# Patient Record
Sex: FEMALE | Race: WHITE | NOT HISPANIC OR LATINO
[De-identification: names, ages, dates, MRNs, and addresses within clinical notes are randomized per-mention and may not be internally consistent; named-entity substitution may affect disease eponyms.]

---

## 2020-12-24 ENCOUNTER — NON-APPOINTMENT (OUTPATIENT)
Age: 45
End: 2020-12-24

## 2020-12-28 PROBLEM — Z00.00 ENCOUNTER FOR PREVENTIVE HEALTH EXAMINATION: Status: ACTIVE | Noted: 2020-12-28

## 2021-01-05 ENCOUNTER — APPOINTMENT (OUTPATIENT)
Dept: OTOLARYNGOLOGY | Facility: CLINIC | Age: 46
End: 2021-01-05
Payer: COMMERCIAL

## 2021-01-05 VITALS
WEIGHT: 150 LBS | BODY MASS INDEX: 24.11 KG/M2 | HEART RATE: 63 BPM | OXYGEN SATURATION: 99 % | DIASTOLIC BLOOD PRESSURE: 90 MMHG | HEIGHT: 66 IN | SYSTOLIC BLOOD PRESSURE: 142 MMHG | TEMPERATURE: 98.2 F

## 2021-01-05 DIAGNOSIS — Z86.39 PERSONAL HISTORY OF OTHER ENDOCRINE, NUTRITIONAL AND METABOLIC DISEASE: ICD-10-CM

## 2021-01-05 DIAGNOSIS — Z80.9 FAMILY HISTORY OF MALIGNANT NEOPLASM, UNSPECIFIED: ICD-10-CM

## 2021-01-05 DIAGNOSIS — Z78.9 OTHER SPECIFIED HEALTH STATUS: ICD-10-CM

## 2021-01-05 PROCEDURE — 31575 DIAGNOSTIC LARYNGOSCOPY: CPT

## 2021-01-05 PROCEDURE — 99204 OFFICE O/P NEW MOD 45 MIN: CPT | Mod: 25

## 2021-01-05 PROCEDURE — 99072 ADDL SUPL MATRL&STAF TM PHE: CPT

## 2021-01-05 PROCEDURE — 76536 US EXAM OF HEAD AND NECK: CPT

## 2021-01-05 RX ORDER — SERTRALINE HYDROCHLORIDE 50 MG/1
50 TABLET, FILM COATED ORAL
Refills: 0 | Status: ACTIVE | COMMUNITY

## 2021-01-05 RX ORDER — NORETHINDRONE ACETATE AND ETHINYL ESTRADIOL, ETHINYL ESTRADIOL AND FERROUS FUMARATE 1MG-10(24)
KIT ORAL
Refills: 0 | Status: ACTIVE | COMMUNITY

## 2021-01-05 RX ORDER — SIMVASTATIN 40 MG/1
40 TABLET, FILM COATED ORAL
Refills: 0 | Status: ACTIVE | COMMUNITY

## 2021-01-05 NOTE — PROCEDURE
[Image(s) Captured] : image(s) captured and filed [Unable to Cooperate with Mirror] : patient unable to cooperate with mirror [Gag Reflex] : gag reflex preventing mirror examination [Topical Lidocaine] : topical lidocaine [Oxymetazoline HCl] : oxymetazoline HCl [Flexible Endoscope] : examined with the flexible endoscope [Serial Number: ___] : Serial Number: [unfilled] [FreeTextEntry1] : Physician performed high-resolution ultrasound gray scale imaging and color Doppler supplementation of the thyroid gland and neck was obtained in the longitudinal and transverse planes using a 13 MHz linear transducer with image capture.  All measurements are in centimeters (longitudinal x AP x transverse).  [FreeTextEntry2] : Primary hyperparathyroidism rule out a parathyroid adenoma and thyroid disease for preoperative assessment. [FreeTextEntry3] : See dictated report. Preliminary findings: Overall the thyroid gland is atrophic and heterogeneous consistent with long-standing autoimmune thyroiditis. There are no enlarged or morphologically abnormal appearing cervical lymph nodes. There were no discrete thyroid nodules. Posterior to the left mid thyroid lobe there is an oblong,  hypoechoic, smoothly marginated nodule with an echogenic line of separation that has a central vascular pedicle, measures 1.51 x 0.41 x 0.60 cm and is compatible with a parathyroid adenoma. A 4-D CT scan will be obtained for confirmation.  [de-identified] : The nasal septum is minimally deviated to the left. There are no masses or polyps and the nasal mucosa and secretions are normal. The choanae and posterior nasopharynx are normal without masses or drainage. The Eustachian tube orifices appear patent. The pharynx, including the posterior and lateral pharyngeal walls, the vallecula and base of tongue are normal without ulcerations, lesions or masses. The hypopharynx including the pyriform sinuses open well without pooling of secretions, mucosal lesions or masses. The supraglottic larynx including the epiglottis, petiole, arytenoids, glossoepiglottic, aryepiglottic and pharyngoepiglottic folds are normal without mucosal lesions, ulcerations or masses. The glottis reveals normal false vocal folds. The true vocal folds are glistening white, tense and of equal length, without paralysis, having symmetric mobility on adduction and abduction. There are no mucosal lesions, nodules, cysts, erythroplasia or leukoplakia. The posterior cricoid area has healthy pink mucosa in the interarytenoid area and esophageal inlet. There is slight thickening/edema of the interarytenoid mucosa suggestive of posterior laryngitis from laryngopharyngeal acid reflux disease. The trachea is clear without narrowing in the immediate subglottic region, without deviation or lesions. \par  [de-identified] : preoperative assessment

## 2021-01-05 NOTE — REASON FOR VISIT
[FreeTextEntry2] : a surgical consultation concerning primary hyperparathyroidism.                            [FreeTextEntry1] : Referred by Jhonathan Hughes MD, PCP Yesenia Lagunas MD

## 2021-01-05 NOTE — HISTORY OF PRESENT ILLNESS
[de-identified] : Georgette is a generally healthy 45-year-old female who works in [employer] and noted sometime this past October by her primary care physician to have both subclinical hypothyroidism as well as hypercalcemia. At that time her calcium level was measured at 10.9 mg/dl.  Her renal function is normal with a GFR at 105 and a low vitamin D 25 OH total of 26. She had been taking 3000 international units of vitamin D 3 over-the-counter.  She was referred to her endocrinologist for hypothyroidism and on subsequent testing that to have persistent hypercalcemia with a calcium level at 10.6 mg/ml and a intact parathyroid hormone level of 105 pg/mL.  Georgette denies recent shortness of breath, voice changes, dysphagia, anterior neck pain, neck pressure or mass. A paternal cousin may have had thyroid cancer. She denies any known radiation exposures in her youth.  She denies calcium, HCTZ or past use of Lithium Carbonate. There is no family history of nephrolithiasis or renal disease. She had stress fracture of her left foot after running October 2019.  Other than fatigue, depression, memory loss, brain fog, constipation, polyuria/ polydipsia, she denies muscle weakness, generalized bone aches, joint pain, nausea, vomiting, abdominal pain, nephrolithiasis, peptic ulcer disease, pancreatitis or GERD. She gained weight during the pandemic and is now taking Synthroid 75 mcgs daily and euthyroid after a period of hypothyroidism. A thyroid ultrasound performed on 12/4/2020 demonstrated a very small thyroid gland that was described as heterogeneous without discrete nodules.  A 1.5 x 0.4 x 0.6 CM nodule posterior to the left lobe was consistent with a possible lymph node. She denies fever, body aches, cough, cyanosis, chest burning, anosmia or recent known COVID exposures.  All family members at home are well.

## 2021-01-05 NOTE — CONSULT LETTER
[Dear  ___] : Dear  [unfilled], [Consult Letter:] : I had the pleasure of evaluating your patient, [unfilled]. [Please see my note below.] : Please see my note below. [Consult Closing:] : Thank you very much for allowing me to participate in the care of this patient.  If you have any questions, please do not hesitate to contact me. [Sincerely,] : Sincerely, [FreeTextEntry3] : \par Moiz Tidwell M.D., FACS, ECNU\par Director [facility] Thyroid [facility] Parathyroid Surgery\par [facility] [place] [facility]\par Certified in Thyroid/Parathyroid/Neck Ultrasound, ECNU/ AIUM\par \par , Department of Otolaryngology\par [facility] [employer] at [facility]\par   [DrPamella  ___] : Dr. LOPEZ

## 2021-01-06 ENCOUNTER — TRANSCRIPTION ENCOUNTER (OUTPATIENT)
Age: 46
End: 2021-01-06

## 2021-01-14 ENCOUNTER — TRANSCRIPTION ENCOUNTER (OUTPATIENT)
Age: 46
End: 2021-01-14

## 2021-01-17 ENCOUNTER — APPOINTMENT (OUTPATIENT)
Dept: DISASTER EMERGENCY | Facility: CLINIC | Age: 46
End: 2021-01-17

## 2021-01-17 DIAGNOSIS — Z01.818 ENCOUNTER FOR OTHER PREPROCEDURAL EXAMINATION: ICD-10-CM

## 2021-01-18 LAB — SARS-COV-2 N GENE NPH QL NAA+PROBE: NOT DETECTED

## 2021-01-19 ENCOUNTER — TRANSCRIPTION ENCOUNTER (OUTPATIENT)
Age: 46
End: 2021-01-19

## 2021-01-19 VITALS
WEIGHT: 153.44 LBS | DIASTOLIC BLOOD PRESSURE: 88 MMHG | TEMPERATURE: 97 F | RESPIRATION RATE: 16 BRPM | SYSTOLIC BLOOD PRESSURE: 133 MMHG | OXYGEN SATURATION: 97 % | HEIGHT: 66 IN | HEART RATE: 53 BPM

## 2021-01-19 RX ORDER — ACETAMINOPHEN AND CODEINE 300; 30 MG/1; MG/1
300-30 TABLET ORAL
Qty: 12 | Refills: 0 | Status: DISCONTINUED | COMMUNITY
Start: 2021-01-19 | End: 2021-01-19

## 2021-01-19 NOTE — ASU PATIENT PROFILE, ADULT - PSH
H/O breast biopsy     H/O breast biopsy    H/O rhinoplasty    History of lumpectomy of right breast

## 2021-01-20 ENCOUNTER — APPOINTMENT (OUTPATIENT)
Dept: OTOLARYNGOLOGY | Facility: HOSPITAL | Age: 46
End: 2021-01-20

## 2021-01-20 ENCOUNTER — OUTPATIENT (OUTPATIENT)
Dept: INPATIENT UNIT | Facility: HOSPITAL | Age: 46
LOS: 1 days | Discharge: HOME | End: 2021-01-20
Payer: COMMERCIAL

## 2021-01-20 ENCOUNTER — RESULT REVIEW (OUTPATIENT)
Age: 46
End: 2021-01-20

## 2021-01-20 VITALS
OXYGEN SATURATION: 97 % | RESPIRATION RATE: 15 BRPM | DIASTOLIC BLOOD PRESSURE: 76 MMHG | SYSTOLIC BLOOD PRESSURE: 126 MMHG | HEART RATE: 71 BPM

## 2021-01-20 DIAGNOSIS — Z98.890 OTHER SPECIFIED POSTPROCEDURAL STATES: Chronic | ICD-10-CM

## 2021-01-20 DIAGNOSIS — R21 RASH AND OTHER NONSPECIFIC SKIN ERUPTION: ICD-10-CM

## 2021-01-20 LAB
ALBUMIN SERPL ELPH-MCNC: 4.3 G/DL — SIGNIFICANT CHANGE UP (ref 3.3–5)
CALCIUM SERPL-MCNC: 9.7 MG/DL — SIGNIFICANT CHANGE UP (ref 8.4–10.5)
MAGNESIUM SERPL-MCNC: 1.2 MG/DL — LOW (ref 1.6–2.6)
PHOSPHATE SERPL-MCNC: 3 MG/DL — SIGNIFICANT CHANGE UP (ref 2.5–4.5)
PTH-INTACT IO % DIF SERPL: 102.4 PG/ML — HIGH (ref 8.5–72.5)
PTH-INTACT IO % DIF SERPL: 11.9 PG/ML — SIGNIFICANT CHANGE UP (ref 8.5–72.5)
PTH-INTACT IO % DIF SERPL: 14.8 PG/ML — SIGNIFICANT CHANGE UP (ref 8.5–72.5)
PTH-INTACT IO % DIF SERPL: 6.6 PG/ML — LOW (ref 8.5–72.5)

## 2021-01-20 PROCEDURE — C1889: CPT

## 2021-01-20 PROCEDURE — 83970 ASSAY OF PARATHORMONE: CPT

## 2021-01-20 PROCEDURE — 88331 PATH CONSLTJ SURG 1 BLK 1SPC: CPT

## 2021-01-20 PROCEDURE — 88305 TISSUE EXAM BY PATHOLOGIST: CPT

## 2021-01-20 PROCEDURE — 88305 TISSUE EXAM BY PATHOLOGIST: CPT | Mod: 26

## 2021-01-20 PROCEDURE — 36415 COLL VENOUS BLD VENIPUNCTURE: CPT

## 2021-01-20 PROCEDURE — 60500 EXPLORE PARATHYROID GLANDS: CPT | Mod: LT

## 2021-01-20 PROCEDURE — 82040 ASSAY OF SERUM ALBUMIN: CPT

## 2021-01-20 PROCEDURE — 84100 ASSAY OF PHOSPHORUS: CPT

## 2021-01-20 PROCEDURE — 82310 ASSAY OF CALCIUM: CPT

## 2021-01-20 PROCEDURE — 60500 EXPLORE PARATHYROID GLANDS: CPT

## 2021-01-20 PROCEDURE — 83735 ASSAY OF MAGNESIUM: CPT

## 2021-01-20 PROCEDURE — 88331 PATH CONSLTJ SURG 1 BLK 1SPC: CPT | Mod: 26

## 2021-01-20 RX ORDER — MAGNESIUM SULFATE 500 MG/ML
2 VIAL (ML) INJECTION EVERY 6 HOURS
Refills: 0 | Status: DISCONTINUED | OUTPATIENT
Start: 2021-01-20 | End: 2021-01-20

## 2021-01-20 RX ORDER — ACETAMINOPHEN 500 MG
1000 TABLET ORAL ONCE
Refills: 0 | Status: DISCONTINUED | OUTPATIENT
Start: 2021-01-20 | End: 2021-01-20

## 2021-01-20 RX ORDER — HYDROMORPHONE HYDROCHLORIDE 2 MG/ML
0.5 INJECTION INTRAMUSCULAR; INTRAVENOUS; SUBCUTANEOUS ONCE
Refills: 0 | Status: DISCONTINUED | OUTPATIENT
Start: 2021-01-20 | End: 2021-01-20

## 2021-01-20 RX ORDER — ONDANSETRON 8 MG/1
4 TABLET, FILM COATED ORAL EVERY 6 HOURS
Refills: 0 | Status: DISCONTINUED | OUTPATIENT
Start: 2021-01-20 | End: 2021-01-20

## 2021-01-20 RX ADMIN — Medication 50 GRAM(S): at 17:49

## 2021-01-20 RX ADMIN — HYDROMORPHONE HYDROCHLORIDE 0.5 MILLIGRAM(S): 2 INJECTION INTRAMUSCULAR; INTRAVENOUS; SUBCUTANEOUS at 14:17

## 2021-01-20 NOTE — PROGRESS NOTE ADULT - SUBJECTIVE AND OBJECTIVE BOX
POST-OPERATIVE NOTE    Procedure: left superior parathyroidectomy with operative course complicated by traction injury on the left RLN.     Diagnosis/Indication: Hyperparathyroidism     Surgeon: Dr. Tidwell    S: Patient reports feeling  no complaints - no paresthesia, numbness, tingling, or ''weird sensations''. Denies nausea or vomit, CP, SOB, KHANNA, calf tenderness. Pain controlled with medication.    O:  T(C): 36.7 (01-20-21 @ 12:55), Max: 36.7 (01-20-21 @ 12:55)  T(F): 98.1 (01-20-21 @ 12:55), Max: 98.1 (01-20-21 @ 12:55)  HR: 88 (01-20-21 @ 15:10) (82 - 103)  BP: 131/85 (01-20-21 @ 15:10) (130/72 - 140/83)  RR: 18 (01-20-21 @ 15:10) (14 - 18)  SpO2: 97% (01-20-21 @ 15:10) (97% - 99%)  Wt(kg): --      Gen: resting comfortably in bed, alert  C/V: Normal sinus rhythm per monitor  Neck: anterior neck dressing, no hematoma, no signs of external compression, JPx1  Pulm: Nonlabored breathing, no respiratory distress.   Extrem: WWP, no calf edema, SCDs in place

## 2021-01-20 NOTE — BRIEF OPERATIVE NOTE - OPERATION/FINDINGS
Horizontal incision along natural skin crease. Strap muscles divided. Left superior thyroid gland mobilised and a 1.5cm left superior parathyroid adenoma was excised. The adenoma was found to be tightly adhesed to the left RLN. Left inferior parathyroid gland was identified and preserved, confirmed on frozen section. Left RLN did not stimulate via left vagus nerve stimulation. Strap muscles closed at the end of the case. Fascia closed with vicryl, skin closed with prolene. Horizontal incision along natural skin crease. Strap muscles divided. Left superior thyroid gland mobilised and a 1.5cm left superior parathyroid adenoma was excised. The adenoma was found to be tightly adhesed to the left RLN. Left inferior parathyroid gland was identified and preserved, confirmed on frozen section. Left RLN did not stimulate via left vagus nerve stimulation. Strap muscles closed at the end of the case. Kenneth drain placed beneath the strap muscles. Fascia closed with vicryl, skin closed with prolene.

## 2021-01-20 NOTE — PACU DISCHARGE NOTE - COMMENTS
Discharge Teaching reviewed with patient using teachback method. Copies given. Tolerating po, voiding without difficulty. IV removed intact, with no signs of infiltration.. Walking steadily, no signs of hematoma or bleeding at neck dressing. Accompanied home by .

## 2021-01-20 NOTE — PROGRESS NOTE ADULT - ASSESSMENT
45F with HLD, Hashimoto's, hyperparathyroidisms now s/p left superior parathyroidectomy with operative course complicated by traction injury on the left RLN.     pain/nausea control   5pm labs  Soft diet   AM labs   23hr observation   KARINE x1

## 2021-01-22 PROBLEM — E07.9 DISORDER OF THYROID, UNSPECIFIED: Chronic | Status: ACTIVE | Noted: 2021-01-19

## 2021-01-22 PROBLEM — E78.5 HYPERLIPIDEMIA, UNSPECIFIED: Chronic | Status: ACTIVE | Noted: 2021-01-19

## 2021-01-22 LAB — SURGICAL PATHOLOGY STUDY: SIGNIFICANT CHANGE UP

## 2021-01-28 ENCOUNTER — APPOINTMENT (OUTPATIENT)
Dept: OTOLARYNGOLOGY | Facility: CLINIC | Age: 46
End: 2021-01-28
Payer: COMMERCIAL

## 2021-01-28 VITALS
DIASTOLIC BLOOD PRESSURE: 85 MMHG | OXYGEN SATURATION: 99 % | RESPIRATION RATE: 17 BRPM | TEMPERATURE: 97.6 F | SYSTOLIC BLOOD PRESSURE: 123 MMHG | HEART RATE: 82 BPM

## 2021-01-28 PROCEDURE — 31575 DIAGNOSTIC LARYNGOSCOPY: CPT | Mod: 58

## 2021-01-28 PROCEDURE — 99024 POSTOP FOLLOW-UP VISIT: CPT

## 2021-01-28 RX ORDER — ACETAMINOPHEN AND CODEINE 300; 30 MG/1; MG/1
300-30 TABLET ORAL
Qty: 12 | Refills: 0 | Status: COMPLETED | COMMUNITY
Start: 2021-01-19 | End: 2021-01-28

## 2021-01-28 RX ORDER — AZITHROMYCIN 500 MG/1
500 TABLET, FILM COATED ORAL DAILY
Qty: 1 | Refills: 0 | Status: COMPLETED | COMMUNITY
Start: 2021-01-19 | End: 2021-01-28

## 2021-01-28 NOTE — CONSULT LETTER
[Dear  ___] : Dear  [unfilled], [Consult Letter:] : I had the pleasure of evaluating your patient, [unfilled]. [Please see my note below.] : Please see my note below. [Consult Closing:] : Thank you very much for allowing me to participate in the care of this patient.  If you have any questions, please do not hesitate to contact me. [Sincerely,] : Sincerely, [FreeTextEntry3] : \par Moiz Tidwell M.D., FACS, ECNU\par Director Center for Thyroid & Parathyroid Surgery\par [facility] [place] [facility]\par Certified in Thyroid/Parathyroid/Neck Ultrasound, ECNU/ AIUM\par \par , Department of Otolaryngology\par [facility] [employer] at [facility]\par   [DrPamella  ___] : Dr. LOPEZ

## 2021-01-28 NOTE — REASON FOR VISIT
[FreeTextEntry2] : a first post op visit afterp parathyroidectomy [FreeTextEntry1] : Referred by Jhonathan Hughes MD, PCP Yesenia Lagunas MD

## 2021-01-28 NOTE — PROCEDURE
[Image(s) Captured] : image(s) captured and filed [Unable to Cooperate with Mirror] : patient unable to cooperate with mirror [Gag Reflex] : gag reflex preventing mirror examination [Hoarseness] : hoarseness not clearly evaluated by indirect laryngoscopy [Topical Lidocaine] : topical lidocaine [Oxymetazoline HCl] : oxymetazoline HCl [Flexible Endoscope] : examined with the flexible endoscope [Serial Number: ___] : Serial Number: [unfilled] [de-identified] : The nasal septum is minimally deviated to the left. There are no masses or polyps and the nasal mucosa and secretions are normal. The choanae and posterior nasopharynx are normal without masses or drainage. The Eustachian tube orifices appear patent. The pharynx, including the posterior and lateral pharyngeal walls, the vallecula and base of tongue are normal without ulcerations, lesions or masses. The hypopharynx including the pyriform sinuses open well without pooling of secretions, mucosal lesions or masses. The supraglottic larynx including the epiglottis, petiole, arytenoids, glossoepiglottic, aryepiglottic and pharyngoepiglottic folds are normal without mucosal lesions, ulcerations or masses. The glottis reveals normal false vocal folds. 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 . There are no mucosal lesions, nodules, cysts, erythroplasia or leukoplakia. The posterior cricoid area has healthy pink mucosa in the interarytenoid area and esophageal inlet. There is slight thickening/edema of the interarytenoid mucosa suggestive of posterior laryngitis from laryngopharyngeal acid reflux disease. The trachea is clear without narrowing in the immediate subglottic region, without deviation or lesions. \par  [de-identified] : post operative assessment after parathyroidectomy.

## 2021-01-28 NOTE — PHYSICAL EXAM
[Normal] : no mass and no adenopathy [de-identified] : The neck is flat without seroma or hematoma. The subcuticular suture was removed. The voice is raspy.  A [place] sign was not present.

## 2021-01-28 NOTE — HISTORY OF PRESENT ILLNESS
[de-identified] : Georgette is a generally healthy 45-year-old female who works in [employer] and noted sometime this past October by her primary care physician to have both subclinical hypothyroidism as well as hypercalcemia. At that time her calcium level was measured at 10.9 mg/dl.  Her renal function is normal with a GFR at 105 and a low vitamin D 25 OH total of 26. She had been taking 3000 international units of vitamin D 3 over-the-counter.  She was referred to her endocrinologist for hypothyroidism and on subsequent testing that to have persistent hypercalcemia with a calcium level at 10.6 mg/ml and a intact parathyroid hormone level of 105 pg/mL.  Georgette denies recent shortness of breath, voice changes, dysphagia, anterior neck pain, neck pressure or mass. A paternal cousin may have had thyroid cancer. She denies any known radiation exposures in her youth.  She denies calcium, HCTZ or past use of Lithium Carbonate. There is no family history of nephrolithiasis or renal disease. She had stress fracture of her left foot after running October 2019.  Other than fatigue, depression, memory loss, brain fog, constipation, polyuria/ polydipsia, she denies muscle weakness, generalized bone aches, joint pain, nausea, vomiting, abdominal pain, nephrolithiasis, peptic ulcer disease, pancreatitis or GERD. She gained weight during the pandemic and is now taking Synthroid 75 mcgs daily and euthyroid after a period of hypothyroidism. A thyroid ultrasound performed on 12/4/2020 demonstrated a very small thyroid gland that was described as heterogeneous without discrete nodules.  A 1.5 x 0.4 x 0.6 CM nodule posterior to the left lobe was consistent with a possible lymph node. She denies fever, body aches, cough, cyanosis, chest burning, anosmia or recent known COVID exposures.  All family members at home are well. \par  [FreeTextEntry1] : Georgette returns for a first post op visit after a difficult parathyroidectomy on 01/20/2021. Her thyroid gland was atrophic and fibrotic with a parathyroid adenoma buried in fibrosis and adherent to the left RLN.  This required dissection fo the nerve and a short segment neurapraxia.  She denies paresthesias and is taking 1 Citracal BID.  Voice is hoarse. Surgical pathology revealed several reactive lymph nodes and a hypercellular left low lying upper parathyroid  gland, 250 mg, 1.5 cm.

## 2021-01-28 NOTE — DATA REVIEWED
[de-identified] : see HPI [de-identified] : see HPI [de-identified] : surgical path reviewed with patient

## 2021-01-29 ENCOUNTER — TRANSCRIPTION ENCOUNTER (OUTPATIENT)
Age: 46
End: 2021-01-29

## 2021-01-29 LAB
25(OH)D3 SERPL-MCNC: 33.2 NG/ML
ALBUMIN SERPL ELPH-MCNC: 4.4 G/DL
ALP BLD-CCNC: 68 U/L
ALT SERPL-CCNC: 15 U/L
ANION GAP SERPL CALC-SCNC: 17 MMOL/L
AST SERPL-CCNC: 18 U/L
BILIRUB SERPL-MCNC: 0.3 MG/DL
BUN SERPL-MCNC: 11 MG/DL
CA-I SERPL-SCNC: 1.35 MMOL/L
CALCIUM SERPL-MCNC: 10.4 MG/DL
CALCIUM SERPL-MCNC: 10.4 MG/DL
CHLORIDE SERPL-SCNC: 101 MMOL/L
CO2 SERPL-SCNC: 24 MMOL/L
CREAT SERPL-MCNC: 0.8 MG/DL
GLUCOSE SERPL-MCNC: 96 MG/DL
PARATHYROID HORMONE INTACT: 19 PG/ML
POTASSIUM SERPL-SCNC: 5 MMOL/L
PROT SERPL-MCNC: 7.1 G/DL
SODIUM SERPL-SCNC: 141 MMOL/L

## 2021-02-01 ENCOUNTER — TRANSCRIPTION ENCOUNTER (OUTPATIENT)
Age: 46
End: 2021-02-01

## 2021-02-01 PROBLEM — R21 RASH, SKIN: Status: ACTIVE | Noted: 2021-02-01

## 2021-02-02 ENCOUNTER — TRANSCRIPTION ENCOUNTER (OUTPATIENT)
Age: 46
End: 2021-02-02

## 2021-03-11 ENCOUNTER — APPOINTMENT (OUTPATIENT)
Dept: OTOLARYNGOLOGY | Facility: CLINIC | Age: 46
End: 2021-03-11
Payer: COMMERCIAL

## 2021-03-11 VITALS
OXYGEN SATURATION: 97 % | TEMPERATURE: 98 F | HEART RATE: 68 BPM | SYSTOLIC BLOOD PRESSURE: 134 MMHG | DIASTOLIC BLOOD PRESSURE: 90 MMHG

## 2021-03-11 PROCEDURE — 99024 POSTOP FOLLOW-UP VISIT: CPT

## 2021-03-11 PROCEDURE — 31575 DIAGNOSTIC LARYNGOSCOPY: CPT | Mod: 58

## 2021-03-11 RX ORDER — CLOBETASOL PROPIONATE 0.5 MG/G
0.05 CREAM TOPICAL 3 TIMES DAILY
Qty: 1 | Refills: 0 | Status: ACTIVE | COMMUNITY
Start: 2021-03-11 | End: 1900-01-01

## 2021-03-11 NOTE — PHYSICAL EXAM
[Normal] : no mass and no adenopathy [de-identified] : The neck is flat without seroma or hematoma.  The incision is healing well albeit hyperemic.

## 2021-03-11 NOTE — REASON FOR VISIT
[FreeTextEntry2] : a second post op visit after parathyroidectomy [FreeTextEntry1] : Referred by Jhonathan Hughes MD, PCP Yesenia Lagunas MD

## 2021-03-11 NOTE — PROCEDURE
[Image(s) Captured] : image(s) captured and filed [Unable to Cooperate with Mirror] : patient unable to cooperate with mirror [Gag Reflex] : gag reflex preventing mirror examination [Hoarseness] : hoarseness not clearly evaluated by indirect laryngoscopy [Topical Lidocaine] : topical lidocaine [Oxymetazoline HCl] : oxymetazoline HCl [Flexible Endoscope] : examined with the flexible endoscope [Serial Number: ___] : Serial Number: [unfilled] [de-identified] : The nasal septum is minimally deviated to the left. There are no masses or polyps and the nasal mucosa and secretions are normal. The choanae and posterior nasopharynx are normal without masses or drainage. The Eustachian tube orifices appear patent. The pharynx, including the posterior and lateral pharyngeal walls, the vallecula and base of tongue are normal without ulcerations, lesions or masses. The hypopharynx including the pyriform sinuses open well without pooling of secretions, mucosal lesions or masses. The supraglottic larynx including the epiglottis, petiole, arytenoids, glossoepiglottic, aryepiglottic and pharyngoepiglottic folds are normal without mucosal lesions, ulcerations or masses. The glottis reveals normal false vocal folds. The true vocal folds are less hyperemic with near full recovery of a previous left vocal fold paresis with total glottic closure. There are no mucosal lesions, nodules, cysts, erythroplasia or leukoplakia. The posterior cricoid area has healthy pink mucosa in the interarytenoid area and esophageal inlet. There is slight thickening/edema of the interarytenoid mucosa suggestive of posterior laryngitis from laryngopharyngeal acid reflux disease. The trachea is clear without narrowing in the immediate subglottic region, without deviation or lesions. \par  [de-identified] : post operative assessment after parathyroidectomy vocal fold paresis.

## 2021-03-11 NOTE — DATA REVIEWED
[de-identified] : see HPI [de-identified] : see HPI [de-identified] : surgical path reviewed with patient

## 2021-03-11 NOTE — CONSULT LETTER
[Dear  ___] : Dear  [unfilled], [Consult Letter:] : I had the pleasure of evaluating your patient, [unfilled]. [Please see my note below.] : Please see my note below. [Consult Closing:] : Thank you very much for allowing me to participate in the care of this patient.  If you have any questions, please do not hesitate to contact me. [Sincerely,] : Sincerely, [DrPamella  ___] : Dr. LOPEZ [FreeTextEntry3] : \par Moiz Tidwell M.D., FACS, ECNU\par Director Center for Thyroid & Parathyroid Surgery\par [facility] [place] [facility]\par Certified in Thyroid/Parathyroid/Neck Ultrasound, ECNU/ AIUM\par \par , Department of Otolaryngology\par [facility] [employer] [facility] at [facility]\par

## 2021-03-11 NOTE — HISTORY OF PRESENT ILLNESS
[de-identified] : Georgette is a generally healthy 45-year-old female who works in [employer] and noted sometime this past October by her primary care physician to have both subclinical hypothyroidism as well as hypercalcemia. At that time her calcium level was measured at 10.9 mg/dl.  Her renal function is normal with a GFR at 105 and a low vitamin D 25 OH total of 26. She had been taking 3000 international units of vitamin D 3 over-the-counter.  She was referred to her endocrinologist for hypothyroidism and on subsequent testing that to have persistent hypercalcemia with a calcium level at 10.6 mg/ml and a intact parathyroid hormone level of 105 pg/mL.  Georgette denies recent shortness of breath, voice changes, dysphagia, anterior neck pain, neck pressure or mass. A paternal cousin may have had thyroid cancer. She denies any known radiation exposures in her youth.  She denies calcium, HCTZ or past use of Lithium Carbonate. There is no family history of nephrolithiasis or renal disease. She had stress fracture of her left foot after running October 2019.  Other than fatigue, depression, memory loss, brain fog, constipation, polyuria/ polydipsia, she denies muscle weakness, generalized bone aches, joint pain, nausea, vomiting, abdominal pain, nephrolithiasis, peptic ulcer disease, pancreatitis or GERD. She gained weight during the pandemic and is now taking Synthroid 75 mcgs daily and euthyroid after a period of hypothyroidism. A thyroid ultrasound performed on 12/4/2020 demonstrated a very small thyroid gland that was described as heterogeneous without discrete nodules.  A 1.5 x 0.4 x 0.6 CM nodule posterior to the left lobe was consistent with a possible lymph node. She denies fever, body aches, cough, cyanosis, chest burning, anosmia or recent known COVID exposures.  All family members at home are well. \par  [FreeTextEntry1] : Georgette returns for a 2nd post op visit after a difficult parathyroidectomy on 01/20/2021. Her thyroid gland was atrophic and fibrotic with a parathyroid adenoma buried in fibrosis and adherent to the left RLN.  This required dissection off the nerve and a short segment neurapraxia.  She denies paresthesias and is taking only vitamin D3  3,000 IU daily.  Voice is much improved. Surgical pathology revealed several reactive lymph nodes and a hypercellular left low lying upper parathyroid  gland, 250 mg, 1.5 cm. Her last PTH/ calcium were normal (9.6 mg/dl, 38 pg/ml, vit D 25-OH total  31).

## 2021-06-10 ENCOUNTER — APPOINTMENT (OUTPATIENT)
Dept: OTOLARYNGOLOGY | Facility: CLINIC | Age: 46
End: 2021-06-10
Payer: COMMERCIAL

## 2021-06-10 VITALS
HEIGHT: 65 IN | HEART RATE: 63 BPM | DIASTOLIC BLOOD PRESSURE: 89 MMHG | BODY MASS INDEX: 24.16 KG/M2 | TEMPERATURE: 97.9 F | WEIGHT: 145 LBS | OXYGEN SATURATION: 98 % | SYSTOLIC BLOOD PRESSURE: 148 MMHG

## 2021-06-10 DIAGNOSIS — K14.2: ICD-10-CM

## 2021-06-10 PROCEDURE — 99072 ADDL SUPL MATRL&STAF TM PHE: CPT

## 2021-06-10 PROCEDURE — 31575 DIAGNOSTIC LARYNGOSCOPY: CPT

## 2021-06-10 PROCEDURE — 99215 OFFICE O/P EST HI 40 MIN: CPT | Mod: 25

## 2021-06-10 NOTE — PROCEDURE
[Image(s) Captured] : image(s) captured and filed [Unable to Cooperate with Mirror] : patient unable to cooperate with mirror [Gag Reflex] : gag reflex preventing mirror examination [Topical Lidocaine] : topical lidocaine [Hoarseness] : hoarseness not clearly evaluated by indirect laryngoscopy [Oxymetazoline HCl] : oxymetazoline HCl [Flexible Endoscope] : examined with the flexible endoscope [Serial Number: ___] : Serial Number: [unfilled] [de-identified] : The nasal septum is minimally deviated to the left. There are no masses or polyps and the nasal mucosa and secretions are normal. The choanae and posterior nasopharynx are normal without masses or drainage. The Eustachian tube orifices appear patent. The pharynx, including the posterior and lateral pharyngeal walls, the vallecula and base of tongue are normal without ulcerations, lesions or masses. The hypopharynx including the pyriform sinuses open well without pooling of secretions, mucosal lesions or masses. The supraglottic larynx including the epiglottis, petiole, arytenoids, glossoepiglottic, aryepiglottic and pharyngoepiglottic folds are normal without mucosal lesions, ulcerations or masses. The glottis reveals normal false vocal folds. 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 There are no mucosal lesions, nodules, cysts, erythroplasia or leukoplakia. The posterior cricoid area has healthy pink mucosa in the interarytenoid area and esophageal inlet. There is minimal/ thickening edema of the interarytenoid mucosa suggestive of posterior laryngitis from laryngopharyngeal acid reflux disease. The trachea is clear without narrowing in the immediate subglottic region, without deviation or lesions. \par  [de-identified] : post operative assessment after parathyroidectomy vocal fold paresis.

## 2021-06-10 NOTE — HISTORY OF PRESENT ILLNESS
[de-identified] : Georgette is a generally healthy 45-year-old female who works in [employer] and noted sometime this past October by her primary care physician to have both subclinical hypothyroidism as well as hypercalcemia. At that time her calcium level was measured at 10.9 mg/dl.  Her renal function is normal with a GFR at 105 and a low vitamin D 25 OH total of 26. She had been taking 3000 international units of vitamin D 3 over-the-counter.  She was referred to her endocrinologist for hypothyroidism and on subsequent testing that to have persistent hypercalcemia with a calcium level at 10.6 mg/ml and a intact parathyroid hormone level of 105 pg/mL.  Georgette denies recent shortness of breath, voice changes, dysphagia, anterior neck pain, neck pressure or mass. A paternal cousin may have had thyroid cancer. She denies any known radiation exposures in her youth.  She denies calcium, HCTZ or past use of Lithium Carbonate. There is no family history of nephrolithiasis or renal disease. She had stress fracture of her left foot after running October 2019.  Other than fatigue, depression, memory loss, brain fog, constipation, polyuria/ polydipsia, she denies muscle weakness, generalized bone aches, joint pain, nausea, vomiting, abdominal pain, nephrolithiasis, peptic ulcer disease, pancreatitis or GERD. She gained weight during the pandemic and is now taking Synthroid 75 mcgs daily and euthyroid after a period of hypothyroidism. A thyroid ultrasound performed on 12/4/2020 demonstrated a very small thyroid gland that was described as heterogeneous without discrete nodules.  A 1.5 x 0.4 x 0.6 CM nodule posterior to the left lobe was consistent with a possible lymph node. She denies fever, body aches, cough, cyanosis, chest burning, anosmia or recent known COVID exposures.  All family members at home are well. \par  [FreeTextEntry1] : Georgette returns for a follow up visit after a difficult parathyroidectomy on 01/20/2021. Her thyroid gland was atrophic and fibrotic with a parathyroid adenoma buried in fibrosis and adherent to the left RLN.  This required dissection off the nerve and a short segment neurapraxia.  She has remained eucalcemic.  Her last labs in May were: Ca 9.6 mg/dl, PTH 50, vitD-25-OH total 27 on vitamin D3  3,000 IU daily.  Voice is much improved. Surgical pathology revealed several reactive lymph nodes and a hypercellular left low lying upper parathyroid  gland, 250 mg, 1.5 cm. She complains of dry skin, alopecia and dry mouth with itchy feeling in throat and mouth. She has had more difficulty keeping her weight down.  Her last TSH with more elevated with a free T4 at 1.4.  She is being titrated by Dr. Hughes and may need a slightly higher dose of LT4.

## 2021-06-10 NOTE — CONSULT LETTER
[Dear  ___] : Dear  [unfilled], [Consult Letter:] : I had the pleasure of evaluating your patient, [unfilled]. [Please see my note below.] : Please see my note below. [Consult Closing:] : Thank you very much for allowing me to participate in the care of this patient.  If you have any questions, please do not hesitate to contact me. [Sincerely,] : Sincerely, [DrPamella  ___] : Dr. LOPEZ [FreeTextEntry3] : \par Moiz Tidwell M.D., FACS, ECNU\par Director Center for Thyroid & Parathyroid Surgery\par [facility]\par Certified in Thyroid/Parathyroid/Neck Ultrasound, ECNU/ AIUM\par \par , Department of Otolaryngology\par [facility] [employer] [facility] at [facility]\par

## 2021-06-10 NOTE — REASON FOR VISIT
[FreeTextEntry2] : a follow up visit after parathyroidectomy and autoimmune thyroiditis         [FreeTextEntry1] : Referred by Jhonathan Hughes MD, PCP Yesenia Lagunas MD

## 2021-06-15 LAB — BACTERIA THROAT CULT: NORMAL

## 2021-09-13 ENCOUNTER — TRANSCRIPTION ENCOUNTER (OUTPATIENT)
Age: 46
End: 2021-09-13

## 2021-12-13 ENCOUNTER — APPOINTMENT (OUTPATIENT)
Dept: OTOLARYNGOLOGY | Facility: CLINIC | Age: 46
End: 2021-12-13
Payer: COMMERCIAL

## 2021-12-13 VITALS
WEIGHT: 150 LBS | TEMPERATURE: 98.1 F | HEIGHT: 65 IN | DIASTOLIC BLOOD PRESSURE: 87 MMHG | BODY MASS INDEX: 24.99 KG/M2 | OXYGEN SATURATION: 99 % | HEART RATE: 62 BPM | SYSTOLIC BLOOD PRESSURE: 133 MMHG

## 2021-12-13 DIAGNOSIS — E83.52 HYPERCALCEMIA: ICD-10-CM

## 2021-12-13 DIAGNOSIS — J31.0 CHRONIC RHINITIS: ICD-10-CM

## 2021-12-13 DIAGNOSIS — R49.9 UNSPECIFIED VOICE AND RESONANCE DISORDER: ICD-10-CM

## 2021-12-13 PROCEDURE — 99214 OFFICE O/P EST MOD 30 MIN: CPT | Mod: 25

## 2021-12-13 PROCEDURE — 31575 DIAGNOSTIC LARYNGOSCOPY: CPT

## 2021-12-13 RX ORDER — FLUCONAZOLE 150 MG/1
150 TABLET ORAL
Qty: 3 | Refills: 0 | Status: ACTIVE | COMMUNITY
Start: 2021-09-08

## 2021-12-13 RX ORDER — LEVOTHYROXINE SODIUM 100 UG/1
100 CAPSULE ORAL
Qty: 90 | Refills: 0 | Status: ACTIVE | COMMUNITY
Start: 2021-10-06

## 2021-12-13 RX ORDER — TRETINOIN 0.5 MG/G
0.05 CREAM TOPICAL
Qty: 45 | Refills: 0 | Status: ACTIVE | COMMUNITY
Start: 2020-10-21

## 2021-12-13 NOTE — REASON FOR VISIT
[FreeTextEntry2] : a follow up visit after parathyroidectomy and autoimmune thyroiditis.         [FreeTextEntry1] : New Endo is Sarah Nevarez MD, PCP Yesenia Lagunas MD

## 2021-12-13 NOTE — CONSULT LETTER
[Dear  ___] : Dear  [unfilled], [Consult Letter:] : I had the pleasure of evaluating your patient, [unfilled]. [Please see my note below.] : Please see my note below. [Consult Closing:] : Thank you very much for allowing me to participate in the care of this patient.  If you have any questions, please do not hesitate to contact me. [Sincerely,] : Sincerely, [DrPamella  ___] : Dr. LOPEZ [FreeTextEntry3] : \par Moiz Tidwell M.D., FACS, ECNU\par Director Center for Thyroid & Parathyroid Surgery\par [facility]\par Certified in Thyroid/Parathyroid/Neck Ultrasound, ECNU/ AIUM\par \par , Department of Otolaryngology\par [facility] [employer] [facility] at [facility]\par

## 2021-12-13 NOTE — PROCEDURE
[Image(s) Captured] : image(s) captured and filed [Unable to Cooperate with Mirror] : patient unable to cooperate with mirror [Gag Reflex] : gag reflex preventing mirror examination [Hoarseness] : hoarseness not clearly evaluated by indirect laryngoscopy [Topical Lidocaine] : topical lidocaine [Oxymetazoline HCl] : oxymetazoline HCl [Flexible Endoscope] : examined with the flexible endoscope [Serial Number: ___] : Serial Number: [unfilled] [de-identified] : The nasal septum is minimally deviated to the left. The mucosa is dry with old blood and crusts on the right. There are no masses or polyps and the nasal mucosa and secretions are normal. The choanae and posterior nasopharynx are normal without masses or drainage. The Eustachian tube orifices appear patent. The pharynx, including the posterior and lateral pharyngeal walls, the vallecula and base of tongue are normal without ulcerations, lesions or masses. The hypopharynx including the pyriform sinuses open well without pooling of secretions, mucosal lesions or masses. The supraglottic larynx including the epiglottis, petiole, arytenoids, glossoepiglottic, aryepiglottic and pharyngoepiglottic folds are normal without mucosal lesions, ulcerations or masses. The glottis reveals normal false vocal folds. 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 There are no mucosal lesions, nodules, cysts, erythroplasia or leukoplakia. The posterior cricoid area has healthy pink mucosa in the interarytenoid area and esophageal inlet. There is slight thickening of the interarytenoid mucosa suggestive of posterior laryngitis from laryngopharyngeal acid reflux disease. The trachea is clear without narrowing in the immediate subglottic region, without deviation or lesions. \par  [de-identified] : follow up after parathyroidectomy vocal fold paresis.

## 2021-12-13 NOTE — HISTORY OF PRESENT ILLNESS
[FreeTextEntry1] : Georgette returns for a follow up visit after a difficult parathyroidectomy on 01/20/2021. Her thyroid gland was atrophic and fibrotic with a parathyroid adenoma buried in fibrosis and adherent to the left RLN.  This required dissection off the nerve and a short segment neurapraxia.  She has remained eucalcemic.  Her last labs last June were: Ca 9.6 mg/dl, PTH 50, vit D-25-OH total 27 on vitamin D3  3,000 IU daily. Her labs in September were: Ca 10.5 mg/dl, PTH 26.    Voice is much improved. Surgical pathology revealed several reactive lymph nodes and a hypercellular left low lying upper parathyroid  gland, 250 mg, 1.5 cm. She complained of dry skin, alopecia and dry mouth with itchy feeling in throat and mouth. She has had more difficulty keeping her weight down.  She was then switched over from levothyroxine to Tirosint 100 mcg x 7 days a week.  This improved her symptoms.  However, her last TSH was low at 0.29, T4 free 1.4.  She is being titrated by Dr. Nevarez and may need a slightly lower dose of LT4.  She is now taking Vit D3 5K IU and vitamin B12 1,000.  [de-identified] : Georgette is a generally healthy 45-year-old female who works in [employer] and noted sometime this past October by her primary care physician to have both subclinical hypothyroidism as well as hypercalcemia. At that time her calcium level was measured at 10.9 mg/dl.  Her renal function is normal with a GFR at 105 and a low vitamin D 25 OH total of 26. She had been taking 3000 international units of vitamin D 3 over-the-counter.  She was referred to her endocrinologist for hypothyroidism and on subsequent testing that to have persistent hypercalcemia with a calcium level at 10.6 mg/ml and a intact parathyroid hormone level of 105 pg/mL.  Georgette denies recent shortness of breath, voice changes, dysphagia, anterior neck pain, neck pressure or mass. A paternal cousin may have had thyroid cancer. She denies any known radiation exposures in her youth.  She denies calcium, HCTZ or past use of Lithium Carbonate. There is no family history of nephrolithiasis or renal disease. She had stress fracture of her left foot after running October 2019.  Other than fatigue, depression, memory loss, brain fog, constipation, polyuria/ polydipsia, she denies muscle weakness, generalized bone aches, joint pain, nausea, vomiting, abdominal pain, nephrolithiasis, peptic ulcer disease, pancreatitis or GERD. She gained weight during the pandemic and is now taking Synthroid 75 mcgs daily and euthyroid after a period of hypothyroidism. A thyroid ultrasound performed on 12/4/2020 demonstrated a very small thyroid gland that was described as heterogeneous without discrete nodules.  A 1.5 x 0.4 x 0.6 CM nodule posterior to the left lobe was consistent with a possible lymph node. She denies fever, body aches, cough, cyanosis, chest burning, anosmia or recent known COVID exposures.  All family members at home are well. \par

## 2022-03-29 ENCOUNTER — TRANSCRIPTION ENCOUNTER (OUTPATIENT)
Age: 47
End: 2022-03-29

## 2022-04-05 LAB
25(OH)D3 SERPL-MCNC: 44.2 NG/ML
ALBUMIN SERPL ELPH-MCNC: 4.6 G/DL
ALP BLD-CCNC: 64 U/L
ALT SERPL-CCNC: 13 U/L
ANION GAP SERPL CALC-SCNC: 12 MMOL/L
AST SERPL-CCNC: 21 U/L
BILIRUB SERPL-MCNC: 0.4 MG/DL
BUN SERPL-MCNC: 9 MG/DL
CALCIUM SERPL-MCNC: 10.2 MG/DL
CALCIUM SERPL-MCNC: 10.2 MG/DL
CHLORIDE SERPL-SCNC: 103 MMOL/L
CO2 SERPL-SCNC: 25 MMOL/L
CREAT SERPL-MCNC: 0.68 MG/DL
EGFR: 110 ML/MIN/1.73M2
GLUCOSE SERPL-MCNC: 93 MG/DL
PARATHYROID HORMONE INTACT: 28 PG/ML
POTASSIUM SERPL-SCNC: 4.7 MMOL/L
PROT SERPL-MCNC: 7 G/DL
SODIUM SERPL-SCNC: 140 MMOL/L
T4 FREE SERPL-MCNC: 1.4 NG/DL
TSH SERPL-ACNC: 0.05 UIU/ML
VIT B12 SERPL-MCNC: 1289 PG/ML

## 2022-05-05 ENCOUNTER — NON-APPOINTMENT (OUTPATIENT)
Age: 47
End: 2022-05-05

## 2022-05-10 ENCOUNTER — APPOINTMENT (OUTPATIENT)
Dept: ORTHOPEDIC SURGERY | Facility: CLINIC | Age: 47
End: 2022-05-10
Payer: COMMERCIAL

## 2022-05-10 VITALS — BODY MASS INDEX: 24.99 KG/M2 | RESPIRATION RATE: 16 BRPM | HEIGHT: 65 IN | WEIGHT: 150 LBS

## 2022-05-10 DIAGNOSIS — M19.041 PRIMARY OSTEOARTHRITIS, RIGHT HAND: ICD-10-CM

## 2022-05-10 DIAGNOSIS — R22.31 LOCALIZED SWELLING, MASS AND LUMP, RIGHT UPPER LIMB: ICD-10-CM

## 2022-05-10 DIAGNOSIS — R22.32 LOCALIZED SWELLING, MASS AND LUMP, LEFT UPPER LIMB: ICD-10-CM

## 2022-05-10 DIAGNOSIS — M19.042 PRIMARY OSTEOARTHRITIS, LEFT HAND: ICD-10-CM

## 2022-05-10 PROCEDURE — 73130 X-RAY EXAM OF HAND: CPT | Mod: 50

## 2022-05-10 PROCEDURE — 99203 OFFICE O/P NEW LOW 30 MIN: CPT

## 2022-05-10 PROCEDURE — 76882 US LMTD JT/FCL EVL NVASC XTR: CPT

## 2022-11-07 ENCOUNTER — TRANSCRIPTION ENCOUNTER (OUTPATIENT)
Age: 47
End: 2022-11-07

## 2022-11-09 ENCOUNTER — NON-APPOINTMENT (OUTPATIENT)
Age: 47
End: 2022-11-09

## 2022-12-08 LAB
25(OH)D3 SERPL-MCNC: 55.5 NG/ML
ALBUMIN SERPL ELPH-MCNC: 4.9 G/DL
ALP BLD-CCNC: 69 U/L
ALT SERPL-CCNC: 17 U/L
ANION GAP SERPL CALC-SCNC: 14 MMOL/L
AST SERPL-CCNC: 26 U/L
BILIRUB SERPL-MCNC: 0.5 MG/DL
BUN SERPL-MCNC: 9 MG/DL
CA-I SERPL-SCNC: 5 MG/DL
CALCIUM SERPL-MCNC: 10.3 MG/DL
CALCIUM SERPL-MCNC: 10.3 MG/DL
CHLORIDE SERPL-SCNC: 100 MMOL/L
CO2 SERPL-SCNC: 26 MMOL/L
CREAT SERPL-MCNC: 0.67 MG/DL
EGFR: 110 ML/MIN/1.73M2
GLUCOSE SERPL-MCNC: 84 MG/DL
PARATHYROID HORMONE INTACT: 36 PG/ML
POTASSIUM SERPL-SCNC: 4.7 MMOL/L
PROT SERPL-MCNC: 7.4 G/DL
SODIUM SERPL-SCNC: 140 MMOL/L
T3FREE SERPL-MCNC: 2.53 PG/ML
T4 FREE SERPL-MCNC: 1.4 NG/DL
TSH SERPL-ACNC: 0.33 UIU/ML
VIT B12 SERPL-MCNC: 1027 PG/ML

## 2022-12-12 ENCOUNTER — APPOINTMENT (OUTPATIENT)
Dept: OTOLARYNGOLOGY | Facility: CLINIC | Age: 47
End: 2022-12-12

## 2023-02-07 ENCOUNTER — APPOINTMENT (OUTPATIENT)
Dept: OTOLARYNGOLOGY | Facility: CLINIC | Age: 48
End: 2023-02-07
Payer: COMMERCIAL

## 2023-02-07 VITALS
HEART RATE: 65 BPM | OXYGEN SATURATION: 100 % | BODY MASS INDEX: 24.99 KG/M2 | TEMPERATURE: 98 F | WEIGHT: 150 LBS | DIASTOLIC BLOOD PRESSURE: 84 MMHG | SYSTOLIC BLOOD PRESSURE: 143 MMHG | HEIGHT: 65 IN

## 2023-02-07 DIAGNOSIS — D35.1 BENIGN NEOPLASM OF PARATHYROID GLAND: ICD-10-CM

## 2023-02-07 DIAGNOSIS — J38.00 PARALYSIS OF VOCAL CORDS AND LARYNX, UNSPECIFIED: ICD-10-CM

## 2023-02-07 DIAGNOSIS — E06.3 AUTOIMMUNE THYROIDITIS: ICD-10-CM

## 2023-02-07 PROCEDURE — 31575 DIAGNOSTIC LARYNGOSCOPY: CPT

## 2023-02-07 PROCEDURE — 76536 US EXAM OF HEAD AND NECK: CPT

## 2023-02-07 PROCEDURE — 99215 OFFICE O/P EST HI 40 MIN: CPT | Mod: 25

## 2023-02-07 NOTE — HISTORY OF PRESENT ILLNESS
[de-identified] : eGorgette is a generally healthy 45-year-old female who works in [employer] and noted sometime this past October by her primary care physician to have both subclinical hypothyroidism as well as hypercalcemia. At that time her calcium level was measured at 10.9 mg/dl.  Her renal function is normal with a GFR at 105 and a low vitamin D 25 OH total of 26. She had been taking 3000 international units of vitamin D 3 over-the-counter.  She was referred to her endocrinologist for hypothyroidism and on subsequent testing that to have persistent hypercalcemia with a calcium level at 10.6 mg/ml and a intact parathyroid hormone level of 105 pg/mL.  Georgette denies recent shortness of breath, voice changes, dysphagia, anterior neck pain, neck pressure or mass. A paternal cousin may have had thyroid cancer. She denies any known radiation exposures in her youth.  She denies calcium, HCTZ or past use of Lithium Carbonate. There is no family history of nephrolithiasis or renal disease. She had stress fracture of her left foot after running October 2019.  Other than fatigue, depression, memory loss, brain fog, constipation, polyuria/ polydipsia, she denies muscle weakness, generalized bone aches, joint pain, nausea, vomiting, abdominal pain, nephrolithiasis, peptic ulcer disease, pancreatitis or GERD. She gained weight during the pandemic and is now taking Synthroid 75 mcgs daily and euthyroid after a period of hypothyroidism. A thyroid ultrasound performed on 12/4/2020 demonstrated a very small thyroid gland that was described as heterogeneous without discrete nodules.  A 1.5 x 0.4 x 0.6 CM nodule posterior to the left lobe was consistent with a possible lymph node. She denies fever, body aches, cough, cyanosis, chest burning, anosmia or recent known COVID exposures.  All family members at home are well. \par  [FreeTextEntry1] : Georgette returns for a follow up visit after a difficult parathyroidectomy on 01/20/2021. Her thyroid gland was atrophic and fibrotic with a parathyroid adenoma buried in fibrosis and adherent to the left RLN.  This required dissection off the nerve and a short segment neurapraxia.  She has remained eucalcemic.  Her last labs last December were: Ca 10.3 mg/dl, PTH 36, vit D-25-OH total 55.5 on vitamin D3  5,000 IU daily. Voice is much improved. Surgical pathology revealed several reactive lymph nodes and a hypercellular left low lying upper parathyroid  gland, 250 mg, 1.5 cm. She complained of dry skin, alopecia and dry mouth with itchy feeling in throat and mouth. She has had more difficulty keeping her weight down.  She is now taking levothyroxine 100 mcg x 7 days a week. Her last TSH was 0.33 in the low normal range, and normal T4 free of 1.4.  She is being monitored by Dr. Nevarez.

## 2023-02-07 NOTE — REASON FOR VISIT
[FreeTextEntry2] : a follow up visit after parathyroidectomy and autoimmune thyroiditis.         [FreeTextEntry1] : New Endocrinologist is Sarah Nevarez MD, PCP Yesenia Lagunas MD

## 2023-02-07 NOTE — CONSULT LETTER
[Dear  ___] : Dear  [unfilled], [Consult Letter:] : I had the pleasure of evaluating your patient, [unfilled]. [Please see my note below.] : Please see my note below. [Consult Closing:] : Thank you very much for allowing me to participate in the care of this patient.  If you have any questions, please do not hesitate to contact me. [Sincerely,] : Sincerely, [DrPamella  ___] : Dr. LOPEZ [FreeTextEntry3] : \par Moiz Tidwell M.D., FACS, ECNU\par Director Center for Thyroid & Parathyroid Surgery\par [facility] [place] [facility]\par Certified in Thyroid/Parathyroid/Neck Ultrasound, ECNU/ AIUM\par \par , Department of Otolaryngology\par [facility] at [facility]\par

## 2023-03-09 ENCOUNTER — TRANSCRIPTION ENCOUNTER (OUTPATIENT)
Age: 48
End: 2023-03-09

## 2023-03-11 ENCOUNTER — TRANSCRIPTION ENCOUNTER (OUTPATIENT)
Age: 48
End: 2023-03-11

## 2023-04-20 ENCOUNTER — APPOINTMENT (OUTPATIENT)
Dept: ENDOCRINOLOGY | Facility: CLINIC | Age: 48
End: 2023-04-20

## 2023-04-21 ENCOUNTER — TRANSCRIPTION ENCOUNTER (OUTPATIENT)
Age: 48
End: 2023-04-21

## 2023-04-21 DIAGNOSIS — E21.3 HYPERPARATHYROIDISM, UNSPECIFIED: ICD-10-CM

## 2023-04-24 ENCOUNTER — TRANSCRIPTION ENCOUNTER (OUTPATIENT)
Age: 48
End: 2023-04-24

## 2023-05-04 LAB
25(OH)D3 SERPL-MCNC: 52.8 NG/ML
ALBUMIN SERPL ELPH-MCNC: 4.8 G/DL
ALP BLD-CCNC: 64 U/L
ALT SERPL-CCNC: 18 U/L
ANION GAP SERPL CALC-SCNC: 15 MMOL/L
AST SERPL-CCNC: 25 U/L
BILIRUB SERPL-MCNC: 0.4 MG/DL
BUN SERPL-MCNC: 11 MG/DL
CALCIUM SERPL-MCNC: 9.9 MG/DL
CALCIUM SERPL-MCNC: 9.9 MG/DL
CHLORIDE SERPL-SCNC: 99 MMOL/L
CO2 SERPL-SCNC: 26 MMOL/L
CREAT SERPL-MCNC: 0.64 MG/DL
EGFR: 111 ML/MIN/1.73M2
GLUCOSE SERPL-MCNC: 130 MG/DL
PARATHYROID HORMONE INTACT: 33 PG/ML
PHOSPHATE SERPL-MCNC: 2.9 MG/DL
POTASSIUM SERPL-SCNC: 4 MMOL/L
PROT SERPL-MCNC: 7.2 G/DL
SODIUM SERPL-SCNC: 141 MMOL/L
T3FREE SERPL-MCNC: 3.6 PG/ML
T4 FREE SERPL-MCNC: 1.5 NG/DL
TSH SERPL-ACNC: 0.01 UIU/ML
VIT B12 SERPL-MCNC: 761 PG/ML

## 2023-07-28 ENCOUNTER — APPOINTMENT (OUTPATIENT)
Dept: ENDOCRINOLOGY | Facility: CLINIC | Age: 48
End: 2023-07-28
Payer: COMMERCIAL

## 2023-07-28 VITALS
BODY MASS INDEX: 25.16 KG/M2 | HEART RATE: 68 BPM | HEIGHT: 65 IN | WEIGHT: 151 LBS | DIASTOLIC BLOOD PRESSURE: 101 MMHG | SYSTOLIC BLOOD PRESSURE: 147 MMHG

## 2023-07-28 DIAGNOSIS — L65.9 NONSCARRING HAIR LOSS, UNSPECIFIED: ICD-10-CM

## 2023-07-28 DIAGNOSIS — E89.2 POSTPROCEDURAL HYPOPARATHYROIDISM: ICD-10-CM

## 2023-07-28 DIAGNOSIS — E03.8 OTHER SPECIFIED HYPOTHYROIDISM: ICD-10-CM

## 2023-07-28 DIAGNOSIS — E06.3 OTHER SPECIFIED HYPOTHYROIDISM: ICD-10-CM

## 2023-07-28 DIAGNOSIS — Z13.820 ENCOUNTER FOR SCREENING FOR OSTEOPOROSIS: ICD-10-CM

## 2023-07-28 PROCEDURE — 99204 OFFICE O/P NEW MOD 45 MIN: CPT

## 2023-07-28 RX ORDER — EPINEPHRINE 0.3 MG/.3ML
0.3 INJECTION INTRAMUSCULAR
Qty: 2 | Refills: 0 | Status: DISCONTINUED | COMMUNITY
Start: 2021-12-02 | End: 2023-07-28

## 2023-07-28 RX ORDER — LEVOTHYROXINE SODIUM 75 UG/1
75 TABLET ORAL
Refills: 0 | Status: DISCONTINUED | COMMUNITY
End: 2023-07-28

## 2023-07-28 RX ORDER — DROSPIRENONE 4 MG/1
4 TABLET, FILM COATED ORAL
Qty: 84 | Refills: 0 | Status: DISCONTINUED | COMMUNITY
Start: 2021-09-08 | End: 2023-07-28

## 2023-07-28 RX ORDER — MULTIVITAMIN
TABLET ORAL
Refills: 0 | Status: ACTIVE | COMMUNITY

## 2023-07-28 RX ORDER — NYSTATIN AND TRIAMCINOLONE ACETONIDE 100000; 1 MG/G; MG/G
100000-0.1 CREAM TOPICAL 3 TIMES DAILY
Qty: 1 | Refills: 0 | Status: DISCONTINUED | COMMUNITY
Start: 2021-02-01 | End: 2023-07-28

## 2023-07-28 RX ORDER — LEVOTHYROXINE SODIUM 88 UG/1
88 TABLET ORAL
Qty: 30 | Refills: 0 | Status: DISCONTINUED | COMMUNITY
Start: 2021-06-01 | End: 2023-07-28

## 2023-07-28 RX ORDER — METHYLPREDNISOLONE 4 MG/1
4 TABLET ORAL
Qty: 1 | Refills: 0 | Status: DISCONTINUED | COMMUNITY
Start: 2021-02-01 | End: 2023-07-28

## 2023-07-28 RX ORDER — CLOTRIMAZOLE AND BETAMETHASONE DIPROPIONATE 10; .5 MG/G; MG/G
1-0.05 CREAM TOPICAL
Qty: 15 | Refills: 0 | Status: DISCONTINUED | COMMUNITY
Start: 2021-09-08 | End: 2023-07-28

## 2023-07-28 RX ORDER — AZITHROMYCIN 250 MG/1
250 TABLET, FILM COATED ORAL
Qty: 6 | Refills: 0 | Status: DISCONTINUED | COMMUNITY
Start: 2021-11-17 | End: 2023-07-28

## 2023-07-28 RX ORDER — CLOTRIMAZOLE 10 MG/1
10 LOZENGE ORAL 3 TIMES DAILY
Qty: 90 | Refills: 1 | Status: DISCONTINUED | COMMUNITY
Start: 2021-06-10 | End: 2023-07-28

## 2023-07-28 RX ORDER — PNV NO.95/FERROUS FUM/FOLIC AC 28MG-0.8MG
TABLET ORAL
Refills: 0 | Status: ACTIVE | COMMUNITY

## 2023-07-28 RX ORDER — KETOCONAZOLE 20.5 MG/ML
2 SHAMPOO, SUSPENSION TOPICAL
Qty: 360 | Refills: 0 | Status: DISCONTINUED | COMMUNITY
Start: 2021-11-12 | End: 2023-07-28

## 2023-07-28 RX ORDER — CHROMIUM 200 MCG
TABLET ORAL
Refills: 0 | Status: ACTIVE | COMMUNITY

## 2023-07-28 RX ORDER — NORETHINDRONE ACETATE AND ETHINYL ESTRADIOL AND FERROUS FUMARATE 1MG-20(21)
1-20 KIT ORAL
Qty: 84 | Refills: 0 | Status: DISCONTINUED | COMMUNITY
Start: 2020-11-16 | End: 2023-07-28

## 2023-07-28 RX ORDER — EZETIMIBE 10 MG/1
10 TABLET ORAL
Qty: 90 | Refills: 0 | Status: DISCONTINUED | COMMUNITY
Start: 2021-06-17 | End: 2023-07-28

## 2023-08-08 ENCOUNTER — NON-APPOINTMENT (OUTPATIENT)
Age: 48
End: 2023-08-08

## 2023-08-17 ENCOUNTER — NON-APPOINTMENT (OUTPATIENT)
Age: 48
End: 2023-08-17

## 2023-08-22 ENCOUNTER — NON-APPOINTMENT (OUTPATIENT)
Age: 48
End: 2023-08-22

## 2023-11-01 ENCOUNTER — NON-APPOINTMENT (OUTPATIENT)
Age: 48
End: 2023-11-01

## 2023-11-02 LAB
25(OH)D3 SERPL-MCNC: 55.6 NG/ML
ALBUMIN SERPL ELPH-MCNC: 4.7 G/DL
ALP BLD-CCNC: 57 U/L
ALT SERPL-CCNC: 14 U/L
ANION GAP SERPL CALC-SCNC: 14 MMOL/L
AST SERPL-CCNC: 22 U/L
BILIRUB SERPL-MCNC: 0.3 MG/DL
BUN SERPL-MCNC: 10 MG/DL
CALCIUM SERPL-MCNC: 9.8 MG/DL
CALCIUM SERPL-MCNC: 9.8 MG/DL
CHLORIDE SERPL-SCNC: 101 MMOL/L
CO2 SERPL-SCNC: 26 MMOL/L
CREAT SERPL-MCNC: 0.75 MG/DL
EGFR: 99 ML/MIN/1.73M2
FERRITIN SERPL-MCNC: 133 NG/ML
GLUCOSE SERPL-MCNC: 111 MG/DL
IRON SATN MFR SERPL: 28 %
IRON SERPL-MCNC: 116 UG/DL
MAGNESIUM SERPL-MCNC: 1.7 MG/DL
PARATHYROID HORMONE INTACT: 28 PG/ML
PHOSPHATE SERPL-MCNC: 3.3 MG/DL
POTASSIUM SERPL-SCNC: 4.3 MMOL/L
PROT SERPL-MCNC: 7.4 G/DL
SODIUM SERPL-SCNC: 140 MMOL/L
T3 SERPL-MCNC: 99 NG/DL
T4 FREE SERPL-MCNC: 1.4 NG/DL
T4 SERPL-MCNC: 10.1 UG/DL
TESTOST SERPL-MCNC: 30.7 NG/DL
TIBC SERPL-MCNC: 418 UG/DL
TSH SERPL-ACNC: 1.33 UIU/ML
UIBC SERPL-MCNC: 302 UG/DL
VIT B12 SERPL-MCNC: 636 PG/ML

## 2023-12-21 ENCOUNTER — TRANSCRIPTION ENCOUNTER (OUTPATIENT)
Age: 48
End: 2023-12-21

## 2023-12-22 ENCOUNTER — TRANSCRIPTION ENCOUNTER (OUTPATIENT)
Age: 48
End: 2023-12-22

## 2023-12-22 RX ORDER — LEVOTHYROXINE SODIUM 0.1 MG/1
100 TABLET ORAL
Qty: 90 | Refills: 1 | Status: ACTIVE | COMMUNITY
Start: 2023-12-22 | End: 1900-01-01

## 2023-12-22 RX ORDER — LEVOTHYROXINE SODIUM 100 UG/1
100 CAPSULE ORAL
Qty: 90 | Refills: 1 | Status: DISCONTINUED | COMMUNITY
Start: 2023-07-28 | End: 2023-12-22

## 2024-02-20 ENCOUNTER — TRANSCRIPTION ENCOUNTER (OUTPATIENT)
Age: 49
End: 2024-02-20

## 2024-03-13 LAB
25(OH)D3 SERPL-MCNC: 56.8 NG/ML
ALBUMIN SERPL ELPH-MCNC: 4.5 G/DL
ALP BLD-CCNC: 51 U/L
ALT SERPL-CCNC: 18 U/L
ANION GAP SERPL CALC-SCNC: 11 MMOL/L
AST SERPL-CCNC: 28 U/L
BILIRUB SERPL-MCNC: 0.2 MG/DL
BUN SERPL-MCNC: 11 MG/DL
CALCIUM SERPL-MCNC: 9.8 MG/DL
CALCIUM SERPL-MCNC: 9.8 MG/DL
CHLORIDE SERPL-SCNC: 100 MMOL/L
CO2 SERPL-SCNC: 26 MMOL/L
CREAT SERPL-MCNC: 0.84 MG/DL
EGFR: 87 ML/MIN/1.73M2
FERRITIN SERPL-MCNC: 110 NG/ML
GLUCOSE SERPL-MCNC: 122 MG/DL
IRON SATN MFR SERPL: 26 %
IRON SERPL-MCNC: 100 UG/DL
MAGNESIUM SERPL-MCNC: 1.8 MG/DL
PARATHYROID HORMONE INTACT: 30 PG/ML
PHOSPHATE SERPL-MCNC: 3.3 MG/DL
POTASSIUM SERPL-SCNC: 4.3 MMOL/L
PROT SERPL-MCNC: 7 G/DL
SODIUM SERPL-SCNC: 137 MMOL/L
T3 SERPL-MCNC: 116 NG/DL
T4 FREE SERPL-MCNC: 1.5 NG/DL
T4 SERPL-MCNC: 10.6 UG/DL
TIBC SERPL-MCNC: 392 UG/DL
TSH SERPL-ACNC: 0.27 UIU/ML
UIBC SERPL-MCNC: 292 UG/DL

## 2024-03-13 NOTE — ADDENDUM
[FreeTextEntry1] : Recent laboratory results as below; discussed with Ms. Coats. Calciotropic panel, thyroid function tests, iron studies, and other test results within the normal range; glucose not fasting. 11/02/23  Recent bone density as below. Bone density is normal at the lumbar spine, femoral neck, and total hip, and in the osteopenic range at the distal radius. Vertebral fracture assessment without evidence of compression fractures. I left a telephone message for Ms. Coats to discuss. 11/16/23  Recent laboratory results as below. TSH at the lower end of normal and will monitor. Calciotropic panel and other test results within range; glucose presumed not fasting based on time of collection. I left a telephone message for Ms. Coats to discuss. 3/13/24  Most recent bone mineral density Date: November 9, 2023 Source: [id number] Site: [facility]  Site	BMD	T-score	Change previous	Change baseline	 Lumbar spine	0.973	-0.7		 Femoral neck	0.838	-2.2			 Total hip           1.007	+0.5			 Distal radius	0.615	-1.3			 DXA comments: Baseline scan at this facility; left hip values Vertebral fracture assessment without evidence of compression fractures L1 to L5 (my read)  Impression: I have personally reviewed the DXA images and report. There is osteopenia by the World Health Organization criteria. Vertebral fracture assessment without evidence of compression fractures.

## 2024-03-13 NOTE — PHYSICAL EXAM
[Alert] : alert [Healthy Appearance] : healthy appearance [No Acute Distress] : no acute distress [Normal Sclera/Conjunctiva] : normal sclera/conjunctiva [Normal Hearing] : hearing was normal [No Neck Mass] : no neck mass was observed [No LAD] : no lymphadenopathy [Well Healed Scar] : well healed scar [No Respiratory Distress] : no respiratory distress [Clear to Auscultation] : lungs were clear to auscultation bilaterally [Normal S1, S2] : normal S1 and S2 [Normal Rate] : heart rate was normal [Regular Rhythm] : with a regular rhythm [No Stigmata of Cushings Syndrome] : no stigmata of Cushings Syndrome [Normal Gait] : normal gait [Normal Insight/Judgement] : insight and judgment were intact [Kyphosis] : no kyphosis present [Acanthosis Nigricans] : no acanthosis nigricans [de-identified] : no moon facies, no supraclavicular fat pads

## 2024-03-13 NOTE — ASSESSMENT
[FreeTextEntry1] : Hypothyroidism. We will adjust her levothyroxine regimen to generic Tirosint. We reviewed proper use and compliance with levothyroxine.  Adjust levothyroxine to generic Tirosint 100 mcg daily for goal TSH 0.5-2.5 uIU/mL; plan to monitor thyroid function tests in 6 weeks  Status post parathyroidectomy. Screening for osteoporosis. She was diagnosed with primary hyperparathyroidism in 2021 in the setting of hypercalcemia with elevated PTH concentrations. She is status post parathyroidectomy on January 20, 2021. Surgical pathology demonstrated a left low lying upper parathyroid gland weighing 250 mg and measuring 1.5 cm. She has had evidence of biochemical cure. Monitor calciotropic panel with next blood tests Calcium 1000 mg daily from diet and supplements (to be taken in divided doses as no more than 500-600 mg can be absorbed at one time); advised increased dietary and/or supplemental calcium Continue current vitamin D regimen Bone density testing  Hair loss. We will ensure she is biochemically euthyroid and perform laboratory evaluation as below. We discussed that first line therapy for hair loss is topical minoxidil. We discussed the limited data for biotin, Nutrafol, Viviscal, and collagen supplements.

## 2024-07-26 ENCOUNTER — RX RENEWAL (OUTPATIENT)
Age: 49
End: 2024-07-26

## 2024-08-02 ENCOUNTER — TRANSCRIPTION ENCOUNTER (OUTPATIENT)
Age: 49
End: 2024-08-02

## 2024-08-03 PROBLEM — E06.3 HYPOTHYROIDISM DUE TO HASHIMOTO'S THYROIDITIS: Status: ACTIVE | Noted: 2021-01-05

## 2024-08-12 ENCOUNTER — TRANSCRIPTION ENCOUNTER (OUTPATIENT)
Age: 49
End: 2024-08-12

## 2024-08-19 ENCOUNTER — LABORATORY RESULT (OUTPATIENT)
Age: 49
End: 2024-08-19

## 2024-09-03 ENCOUNTER — NON-APPOINTMENT (OUTPATIENT)
Age: 49
End: 2024-09-03

## 2024-09-04 ENCOUNTER — APPOINTMENT (OUTPATIENT)
Dept: ENDOCRINOLOGY | Facility: CLINIC | Age: 49
End: 2024-09-04

## 2024-09-06 ENCOUNTER — APPOINTMENT (OUTPATIENT)
Dept: ENDOCRINOLOGY | Facility: CLINIC | Age: 49
End: 2024-09-06

## 2024-09-09 ENCOUNTER — APPOINTMENT (OUTPATIENT)
Dept: ENDOCRINOLOGY | Facility: CLINIC | Age: 49
End: 2024-09-09

## 2024-09-09 DIAGNOSIS — Z98.890 OTHER SPECIFIED POSTPROCEDURAL STATES: ICD-10-CM

## 2024-09-09 DIAGNOSIS — E06.3 AUTOIMMUNE THYROIDITIS: ICD-10-CM

## 2024-09-09 DIAGNOSIS — E21.3 HYPERPARATHYROIDISM, UNSPECIFIED: ICD-10-CM

## 2024-09-09 DIAGNOSIS — Z90.89 OTHER SPECIFIED POSTPROCEDURAL STATES: ICD-10-CM

## 2024-09-09 NOTE — ASSESSMENT
[FreeTextEntry1] : 1. Hypothyroidism Current thyroid regimen: Levothyroxine 88mcg daily Most recent TFTs from 8/16/24 with suppressed TSH to 0.14, FT4 1.9 on LT4 100mcg daily Dose adjusted to 88mcg following August labs, as above -- repeat TFTs in 2-4 weeks -- continue LT4 88mcg daily   2. PHPT, s/p parathyroidectomy Dx in 2021, s/p parathyroidectomy on 1/20/21 +e/o biochemical cure Most recent calcium of 9.8 with iPTH of 30 from 3/2024  Labs in 2-4 weeks, I will call/[facility] message with results  Shannan Lozada MS, FNP-BC, [facility] 09/09/2024

## 2024-09-09 NOTE — HISTORY OF PRESENT ILLNESS
[FreeTextEntry1] : Ms. GONZALES is a 48- year-old female with pmhx of hypothyroidism, PHPT (s/p parathyroidectomy in 2021) who presents for follow-up.  Patient of Dr Mercedes, last (initial) visit, 7/28/2023   Interval change: Most recent labs from 8/16/24, as below Levothyroxine dosage decreased following August labs  Current regimen: Levothyroxine 100mcg daily  1. Hypothyroidism. Dx with hypothyroidism in 2021. Current regimen: levothyroxine 100 mcg daily.  H/o combination levothyroxine + liothyronine therapy (no noted differences than on LT4 monotherapy); Tirosint  (preferred Tirosint brand when covered by her insurance). She is taking levothyroxine in the morning, on an empty stomach, with plain water, and waiting at least 30 minutes before eating. She is taking a multivitamin and  from levothyroxine by at least four hours.  No history of radiation exposure other than medical imaging. Paternal aunt with history of Hashimoto's disease.  2. Primary hyperparathyroidism status post parathyroidectomy. She was diagnosed with primary hyperparathyroidism in 2021 in the setting of hypercalcemia with elevated PTH concentrations. She is status post parathyroidectomy on January 20, 2021. Surgical pathology demonstrated a left low lying upper parathyroid gland weighing 250 mg and measuring 1.5 cm. She has had evidence of biochemical cure. No history of nephrolithiasis. She has a history of childhood right wrist and toe fractures. She had a left foot stress fracture in 2019. No history of bone density testing.  She has rare supplemented almond milk or cheese. She is taking Centrum for Adults 50+ (calcium 200 mg, vitamin D 1000 intl units). She is taking vitamin D 5000 intl units daily.